# Patient Record
Sex: MALE | URBAN - METROPOLITAN AREA
[De-identification: names, ages, dates, MRNs, and addresses within clinical notes are randomized per-mention and may not be internally consistent; named-entity substitution may affect disease eponyms.]

---

## 2022-08-26 ENCOUNTER — TELEPHONE (OUTPATIENT)
Dept: PHARMACY | Facility: CLINIC | Age: 62
End: 2022-08-26

## 2022-08-26 NOTE — TELEPHONE ENCOUNTER
[facility] CLINICAL PHARMACY: STATIN THERAPY REVIEW  Identified statin use in persons with diabetes care gap per [employer]. Records dated: 8/19/22. Last Office Visit: 5/16/22 with ARNEL Beal MD [address] leslie (affiliate provider)    Patient found in Outcomes MTM and is not currently eligible for CMR/TIP    ASSESSMENT  DIABETES ADHERENCE    Insurance Records claims through 8/19/22 (2021 [place] = 97%; YTD [facility] =  100%; Potential Fail Date: 10/26/22 ):   Glimepiride 1mg last filled on 5/26/22 for 90 day supply. Next refill due: 8/26/22    [facility]: last filled on 8/23/22 for #90/90 day supply. Lab Results   Component Value Date    LABA1C 6.9 (H) 07/26/2022     STATIN GAP IDENTIFIED    Lab Results   Component Value Date    CHOL 296 (H) 07/26/2022    TRIG 245 (H) 07/26/2022    HDL 84 07/26/2022    LDLCALC 163 (H) 07/26/2022     ALT   Date Value Ref Range Status   07/26/2022 13 5 - 50 U/L Final     AST   Date Value Ref Range Status   07/26/2022 12 9 - 50 U/L Final       The ASCVD Risk score (Malachi Dillard., et al., 2013) failed to calculate for the following reasons: The systolic blood pressure is missing    The smoking status is invalid    Unable to determine if patient is Non-      Hyperlipidemia Goal: could likely benefit from statin therapy given DM, age >43 and . No noted hx of statin trial in limited info/hx available. PLAN  The following are interventions that have been identified:   - Patient identified as having SUPD gap - see fax to affiliate provider    No future appointments.     Latonya Montalvo, PharmD, [facility]  Department, toll free: [phone], option 1    =======================================================   For Pharmacy [address] in place:  No  Recommendation Provided To: Provider: 1 via Fax sent to office  Intervention Detail: New Rx: 1, reason: Needs Additional Therapy  Gap Closed?: No   Intervention Accepted By: Provider: 0  Time Spent (min): 15

## 2022-08-26 NOTE — LETTER
Using a Statin for Your Patient with Diabetes    Date: 22     Dear Kavon Earl MD,  Fax:  [phone]    Concerning patient: Jareth Tucker   :  1960       Your patient has been identified as having diabetes and not currently prescribed statin therapy. Our team of clinical pharmacists is working with the patient's health plan to assess this gap in therapy. Would this patient benefit from statin therapy? · Age over 36 with diabetes and LDL over 70 mg/dL (last 163 mg/dL on 22; unable to calculate 10-yr ASCVD risk d/t incomplete info available)  - If you agree, send a prescription to your patient's pharmacy. - If you do not agree, please return response to fax number below for our records:          ____________________________________________        Or, if patient is not appropriate for statin, please consider using one of these diagnosis codes in a billable visit to remove/close this care gap:  Code Description   G72.0 Drug-induced myopathy    G72.9 Myopathy, unspecified   M60.9 Myositis, unspecified   M62.82 Rhabdomyolysis   T46.6X5A Adverse effect of statin       Please feel free to contact me at the number below with any questions or concerns, or if you would like me to further discuss with your patient. Thank you for your help and consideration in caring for this patient.     Respectfully,  Susy Peters, PharmD, [facility]  Department, toll free: [phone], option 1  Fax: [phone]

## 2022-09-20 NOTE — TELEPHONE ENCOUNTER
Spoke to Rhea Flores at [facility] office. States patient was recently in to office, and it's unclear if statin was discussed Carolina Miller thinks patient's wife is a nurse and may have declined a statin for patient?). Rhea Flores will ask Dr. Kiersten Keating about statin for this patient, and if so, rx to patient's pharmacy.

## 2023-05-22 ENCOUNTER — TELEPHONE (OUTPATIENT)
Dept: PHARMACY | Facility: CLINIC | Age: 63
End: 2023-05-22

## 2023-05-22 NOTE — TELEPHONE ENCOUNTER
[facility] HEALTH CLINICAL PHARMACY: STATIN THERAPY REVIEW  Identified statin use in persons with diabetes care gap per [employer]. Records dated: 5/14/23. Last Visit: 12/22/22 with Curtis Doty. Kaylie Dorado, per [facility] portal (affiliate provider)    Patient found in Outcomes [facility] and is not currently eligible for CMR or TIP    ASSESSMENT  DIABETES ADHERENCE    Insurance Records claims through 5/14/23 (Prior Year [place] = 100% - PASSED; YTD [place] = 100%; Potential Fail Date: 10/13/23):   GLIMEPIRIDE  TAB 1MG last filled on 5/2/23 for 90 day supply. Next refill due: 8/9/23    Prescribed sig:  #90/90ds    Per Insurer Portal: Trulicity 0.40PU last filled on 5/1/23 for 28 day supply. Lab Results   Component Value Date    LABA1C 6.9 (H) 07/26/2022     STATIN GAP IDENTIFIED    Not on File    Lab Results   Component Value Date    CHOL 322 (H) 12/22/2022    TRIG 339 (H) 12/22/2022    HDL 86 12/22/2022    LDLCALC 168 (H) 12/22/2022     ALT   Date Value Ref Range Status   07/26/2022 13 5 - 50 U/L Final     AST   Date Value Ref Range Status   07/26/2022 12 9 - 50 U/L Final       The ASCVD Risk score (Tiny DK, et al., 2019) failed to calculate for the following reasons: The systolic blood pressure is missing    The valid total cholesterol range is 130 to 320 mg/dL    The smoking status is invalid    Unable to determine if patient is Non-        Per ADA 2023 Guidelines: could likely benefit from statin therapy given DM, age >43 and . No noted hx of statin trial in limited info/hx available. Per outreach to affiliate provider office @September 22 [phone] (see 8/26/22 encounter) - provider would review, patient's wife is a nurse and may have declined statin?)    PLAN  The following are interventions that have been identified:  Statin Gap (Diabetes): Atorvastatin 20 mg or Rosuvastatin 10 mg daily (increase as tolerated/appropriate; labs/follow-up per provider discretion)     Letter sent to patient.  Fax to

## 2023-10-03 ENCOUNTER — TELEPHONE (OUTPATIENT)
Dept: PHARMACY | Facility: CLINIC | Age: 63
End: 2023-10-03

## 2023-10-03 NOTE — TELEPHONE ENCOUNTER
[facility] HEALTH CLINICAL PHARMACY: ADHERENCE REVIEW  Identified care gap per [employer]: fills at [facility]: Diabetes adherence    ASSESSMENT  DIABETES ADHERENCE    Insurance Records claims through 23 (Prior Year  [address] = 100% - PASSED; YTD  [address] = 81%; Potential Fail Date: ):   TRULICITY    INJ 0.14/8.0 last filled on 23 for 28 day supply. Next refill due: 23    Prescribed si.75mg weekly  Per Reconcile Dispense History: 1 refill remaining    Per Reconcile Dispense History and Insurer Portal: glimepiride 1mg daily last filled on 23 for 90 day supply. 3 refills remaining  *has refilled other medications in Aug/Sept (verapamil, Symbicort, metoprolol, Eliquis)    Lab Results   Component Value Date    LABA1C 6.9 (H) 2022   *no noted A1c in [employer] portal    PLAN  Patient found in Outcomes MTM and is not currently eligible for CMR or TIP    The following are interventions that have been identified:   Patient overdue refilling Trulicity 7.44RZ weekly and glimepiride 1mg daily. Unsure if patient is still prescribed this medication. -external/affiliate provider; appears both have refill/s remaining and passed DM adherence last year? Attempting to reach patient to review - unable to leave message. Letter sent to patient.     Last Visit: per [employer] portal, 23 with Nadya Reese MD (affiliate provider)    Corby CamposD, [address] [place] [address], toll free: [phone], option 1    =======================================================   For Pharmacy Admin Tracking Only    Program: [place] in place:  No  Gap Closed?: No   Time Spent (min): 10

## 2024-03-13 ENCOUNTER — ENROLLMENT (OUTPATIENT)
Dept: PHARMACY | Facility: CLINIC | Age: 64
End: 2024-03-13

## 2024-06-03 ENCOUNTER — TELEPHONE (OUTPATIENT)
Dept: PHARMACY | Facility: CLINIC | Age: 64
End: 2024-06-03

## 2024-06-03 NOTE — TELEPHONE ENCOUNTER
[facility] HEALTH CLINICAL PHARMACY: STATIN THERAPY REVIEW  Identified statin use in persons with diabetes care gap per [employer].     ASSESSMENT  DIABETES ADHERENCE    Insurance Records claims through 5/24/24 (Prior Year [place] = 100% - PASSED ; YTD [place] = 100%; Potential Fail Date: 9/12/24):   GLIMEPIRIDE  TAB 1MG last filled on 3/31/24 for 90 day supply. Next refill due: 7/2/24    Lab Results   Component Value Date    LABA1C 6.9 (H) 07/26/2022     STATIN GAP IDENTIFIED    Lab Results   Component Value Date    CHOL 297 (H) 11/02/2023    TRIG 447 (H) 11/02/2023    HDL 90 11/02/2023    LDLDIRECT 166 (H) 11/02/2023     Lab Results   Component Value Date    LDL SEE NOTE 11/02/2023    LDLDIRECT 166 (H) 11/02/2023      ALT   Date Value Ref Range Status   11/28/2023 13 5 - 50 U/L Final     AST   Date Value Ref Range Status   11/28/2023 10 9 - 50 U/L Final     The ASCVD Risk score (Tiny DK, et al., 2019) failed to calculate for the following reasons:    The systolic blood pressure is missing    The smoking status is invalid    Unable to determine if patient is Non-      Per ADA guidelines, the patient is likely a candidate for statin therapy.  September 2022 outreach to PCP office: \"provider would review, patient's wife is a nurse and may have declined statin?\"  May 2023: fax to affiliate PCP office re statin and letter to patient    PLAN  The following are interventions that have been identified:   Statin Gap (Diabetes): consider rosuvastatin 20mg daily or atorvastatin 40mg daily (increase as tolerated/appropriate; labs/follow-up per provider discretion)    Fax to affiliate PCP.    Last Visit: 4/4/24 with [facility]  per [employer] portal (Zeeshan Oliveros, affiliate provider)    Yolande Hernández, PharmD, BCACP  Population Health Pharmacist  [facility]  Department, toll free: [phone], option 1     =======================================================   For Pharmacy

## 2025-07-30 ENCOUNTER — TELEPHONE (OUTPATIENT)
Dept: PHARMACY | Facility: CLINIC | Age: 65
End: 2025-07-30